# Patient Record
Sex: MALE | Race: WHITE | NOT HISPANIC OR LATINO | Employment: FULL TIME | URBAN - METROPOLITAN AREA
[De-identification: names, ages, dates, MRNs, and addresses within clinical notes are randomized per-mention and may not be internally consistent; named-entity substitution may affect disease eponyms.]

---

## 2017-08-03 ENCOUNTER — OFFICE VISIT (OUTPATIENT)
Dept: URGENT CARE | Facility: CLINIC | Age: 47
End: 2017-08-03
Payer: COMMERCIAL

## 2017-08-03 VITALS
RESPIRATION RATE: 15 BRPM | WEIGHT: 170 LBS | OXYGEN SATURATION: 99 % | TEMPERATURE: 99.1 F | SYSTOLIC BLOOD PRESSURE: 152 MMHG | DIASTOLIC BLOOD PRESSURE: 92 MMHG | HEART RATE: 82 BPM | HEIGHT: 73 IN | BODY MASS INDEX: 22.53 KG/M2

## 2017-08-03 DIAGNOSIS — R09.81 SINUS CONGESTION: ICD-10-CM

## 2017-08-03 DIAGNOSIS — H92.02 OTALGIA, LEFT: ICD-10-CM

## 2017-08-03 DIAGNOSIS — H61.22 IMPACTED CERUMEN OF LEFT EAR: ICD-10-CM

## 2017-08-03 DIAGNOSIS — R09.82 PND (POST-NASAL DRIP): ICD-10-CM

## 2017-08-03 PROCEDURE — 99204 OFFICE O/P NEW MOD 45 MIN: CPT | Mod: 25 | Performed by: PHYSICIAN ASSISTANT

## 2017-08-03 RX ORDER — NEOMYCIN SULFATE, POLYMYXIN B SULFATE, HYDROCORTISONE 3.5; 10000; 1 MG/ML; [USP'U]/ML; MG/ML
4 SOLUTION/ DROPS AURICULAR (OTIC) 3 TIMES DAILY
Qty: 1 BOTTLE | Refills: 0 | Status: SHIPPED | OUTPATIENT
Start: 2017-08-03 | End: 2017-08-13

## 2017-08-03 ASSESSMENT — ENCOUNTER SYMPTOMS
ABDOMINAL PAIN: 0
COUGH: 0
NAUSEA: 0
CHILLS: 0
VOMITING: 0
FEVER: 0
BLURRED VISION: 0
SPUTUM PRODUCTION: 0
SORE THROAT: 0
DIZZINESS: 0
NECK PAIN: 0
DIARRHEA: 0
WHEEZING: 0
SHORTNESS OF BREATH: 0
BACK PAIN: 0

## 2017-08-03 NOTE — MR AVS SNAPSHOT
"        Suly Real   8/3/2017 12:30 PM   Office Visit   MRN: [id number]    Department:  [facility]   Dept Phone:  [phone]    Description:  Male : 1970   Provider:  Luciano Orona PA-C           Reason for Visit     Sinusitis x 1 week w/left ear pain/clogged feeling      Allergies as of 8/3/2017     Allergen Noted Reactions    Nkda [No Known Drug Allergy] 08/15/2008         You were diagnosed with     PND (post-nasal drip)   [377136]       Sinus congestion   [512817]       Impacted cerumen of left ear   [812038]       Otalgia, left   [662311]         Vital Signs     Blood Pressure Pulse Temperature Respirations Height Weight    152/92 mmHg 82 37.3 °C (99.1 °F) 15 1.854 m (6' 0.99\") 77.111 kg (170 lb)    Body Mass Index Oxygen Saturation Smoking Status             22.43 kg/m2 99% Never Smoker          Basic Information     Date Of Birth Sex Race Ethnicity Preferred Language    1970 Male White Non- English      Health Maintenance     Patient has no pending health maintenance at this time      Current Immunizations     No immunizations on file.      Below and/or attached are the medications your provider expects you to take. Review all of your home medications and newly ordered medications with your provider and/or pharmacist. Follow medication instructions as directed by your provider and/or pharmacist. Please keep your medication list with you and share with your provider. Update the information when medications are discontinued, doses are changed, or new medications (including over-the-counter products) are added; and carry medication information at all times in the event of emergency situations     Allergies:  NKDA - (reactions not documented)               Medications  Valid as of: 2017 -  2:16 PM    Generic Name Brand Name Tablet Size Instructions for use    Hydrocodone-Acetaminophen (Tab) NORCO 5-325 MG Take 1-2 Tabs by mouth every four hours as needed.       " Neomycin-Polymyxin-HC (Solution) NEOMYCIN-POLYMYXIN-HC (OTIC) 1 % Place 4 Drops in ear 3 times a day for 10 days.        .                 Medicines prescribed today were sent to:     [facility], [place] - [address] [place] [address]    Phone: [phone] Fax: [phone]    Open 24 Hours?: No      Medication refill instructions:       If your prescription bottle indicates you have medication refills left, it is not necessary to call your provider’s office. Please contact your pharmacy and they will refill your medication.    If your prescription bottle indicates you do not have any refills left, you may request refills at any time through one of the following ways: The online [employer] system (except Urgent Care), by calling your provider’s office, or by asking your pharmacy to contact your provider’s office with a refill request. Medication refills are processed only during regular business hours and may not be available until the next business day. Your provider may request additional information or to have a follow-up visit with you prior to refilling your medication.   *Please Note: Medication refills are assigned a new Rx number when refilled electronically. Your pharmacy may indicate that no refills were authorized even though a new prescription for the same medication is available at the pharmacy. Please request the medicine by name with the pharmacy before contacting your provider for a refill.        Referral     A referral request has been sent to our patient care coordination department. Please allow 3-5 business days for us to process this request and contact you either by phone or mail. If you do not hear from us by the 5th business day, please call us at [phone].           [employer] Access Code: [email or web address]  Expires: 9/2/2017  2:16 PM    [employer] Software  A secure, online tool to manage your health information     [employer]’s Ziva Software® is a secure, online tool that  connects you to your personalized health information from the privacy of your home -- day or night - making it very easy for you to manage your healthcare. Once the activation process is completed, you can even access your medical information using the FoxyP2 jese, which is available for free in the Apple Jese store or Google Play store.     FoxyP2 provides the following levels of access (as shown below):   My Chart Features   [facility] Doctor [facility]  Specialists [facility]  Urgent  Care Non-[facility]  Primary Care  Doctor   Email your healthcare team securely and privately 24/7 X X X    Manage appointments: schedule your next appointment; view details of past/upcoming appointments X      Request prescription refills. X      View recent personal medical records, including lab and immunizations X X X X   View health record, including health history, allergies, medications X X X X   Read reports about your outpatient visits, procedures, consult and ER notes X X X X   See your discharge summary, which is a recap of your hospital and/or ER visit that includes your diagnosis, lab results, and care plan. X X       How to register for FoxyP2:  1. Go to  [email or web address].  2. Click on the Sign Up Now box, which takes you to the New Member Sign Up page. You will need to provide the following information:  a. Enter your FoxyP2 Access Code exactly as it appears at the top of this page. (You will not need to use this code after you’ve completed the sign-up process. If you do not sign up before the expiration date, you must request a new code.)   b. Enter your date of birth.   c. Enter your home email address.   d. Click Submit, and follow the next screen’s instructions.  3. Create a FoxyP2 ID. This will be your FoxyP2 login ID and cannot be changed, so think of one that is secure and easy to remember.  4. Create a FoxyP2 password. You can change your password at any time.  5. Enter your Password Reset Question  and Answer. This can be used at a later time if you forget your password.   6. Enter your e-mail address. This allows you to receive e-mail notifications when new information is available in [email or web address].  7. Click Sign Up. You can now view your health information.    For assistance activating your [email or web address] account, call [phone]

## 2017-08-03 NOTE — Clinical Note
August 3, 2017       Patient: Suly Real   YOB: 1970   Date of Visit: 8/3/2017         To Whom It May Concern:    It is my medical opinion that Suly Real should be excused from work for yesterday and today due to illness.      If you have any questions or concerns, please don't hesitate to call [phone]          Sincerely,          Luciano Orona PA-C  Electronically Signed

## 2017-08-03 NOTE — PROGRESS NOTES
Subjective:      Suly Real is a 46 y.o. male who presents with Sinusitis            Sinusitis  Associated symptoms include congestion ( sinus) and ear pain ( left  very mild). Pertinent negatives include no chills, coughing, neck pain, shortness of breath or sore throat.   last one week of pressure and discomfort to left ear muffled sounds to left ear , denies fever/chills/cough, denies sorethroat, denies other side ear pain, c/o some to left ,denies nausea/voimting/adpaind/airreha/rash. Denies PMH of aom, dcwg3of PMH ofsinusitis    Review of Systems   Constitutional: Negative for fever and chills.   HENT: Positive for congestion ( sinus), ear pain ( left  very mild) and hearing loss ( left). Negative for ear discharge, sore throat and tinnitus.    Eyes: Negative for blurred vision.   Respiratory: Negative for cough, sputum production, shortness of breath and wheezing.    Cardiovascular: Negative for chest pain.   Gastrointestinal: Negative for nausea, vomiting, abdominal pain and diarrhea.   Musculoskeletal: Negative for back pain and neck pain.   Skin: Negative for rash.   Neurological: Negative for dizziness.   Endo/Heme/Allergies: Environmental allergies:  ? no PMH.     PMH:  has a past medical history of Cellulitis. He also has no past medical history of Congestive heart failure (CMS-HCC), Cancer (CMS-HCC), Infectious disease, COPD, Diabetes, ASTHMA, CAD (coronary artery disease), Stroke (CMS-HCC), Seizure disorder (CMS-HCC), Hypertension, Renal disorder, Liver disease, or Psychiatric disorder.  MEDS:   Current outpatient prescriptions:   •  NEOMYCIN-POLYMYXIN-HC, OTIC, 1 % Solution, Place 4 Drops in ear 3 times a day for 10 days., Disp: 1 Bottle, Rfl: 0  •  hydrocodone-acetaminophen (NORCO) 5-325 MG Tab per tablet, Take 1-2 Tabs by mouth every four hours as needed., Disp: 15 Tab, Rfl: 0  ALLERGIES:   Allergies   Allergen Reactions   • Nkda [No Known Drug Allergy]      SURGHX: History reviewed. No  "pertinent past surgical history.  SOCHX:  reports that he has never smoked. He has never used smokeless tobacco. He reports that he does not drink alcohol or use illicit drugs.  FH: Family history was reviewed, no pertinent findings to report    I have worn a mask for the entire encounter with this patient.        Objective:     /92 mmHg  Pulse 82  Temp(Src) 37.3 °C (99.1 °F)  Resp 15  Ht 1.854 m (6' 0.99\")  Wt 77.111 kg (170 lb)  BMI 22.43 kg/m2  SpO2 99%     Physical Exam   Constitutional: He is oriented to person, place, and time. He appears well-developed and well-nourished. No distress.   HENT:   Head: Normocephalic and atraumatic.   Right Ear: External ear and ear canal normal. Tympanic membrane is bulging. Tympanic membrane is not erythematous.   Left Ear: External ear normal. Tympanic membrane is bulging. Tympanic membrane is not erythematous.   Nose: Nose normal. Right sinus exhibits no maxillary sinus tenderness and no frontal sinus tenderness. Left sinus exhibits no maxillary sinus tenderness and no frontal sinus tenderness.   Mouth/Throat: Uvula is midline and mucous membranes are normal. Posterior oropharyngeal erythema ( PND) present. No oropharyngeal exudate, posterior oropharyngeal edema or tonsillar abscesses.   Left canal w/ cerumen impaction, erythema to visible canal   Eyes: Conjunctivae are normal. Right eye exhibits no discharge. Left eye exhibits no discharge. No scleral icterus.   Neck: Neck supple.   Pulmonary/Chest: Effort normal and breath sounds normal. No respiratory distress. He has no decreased breath sounds. He has no wheezes. He has no rhonchi. He has no rales.   Musculoskeletal: Normal range of motion.   Lymphadenopathy:     He has no cervical adenopathy.   Neurological: He is alert and oriented to person, place, and time. He exhibits normal muscle tone. Coordination normal.   Skin: Skin is warm and dry. He is not diaphoretic. No pallor.   Psychiatric: He has a normal " mood and affect.   Nursing note and vitals reviewed.         Procedure: Cerumen Removal  Risks and benefits of procedure discussed  Cerumen removed with curette and lavage after softening agent instilled  Patient tolerated well  Post procedure exam with mild erythema to canal and normal left TM     Assessment/Plan:     1. PND (post-nasal drip)  Supportive care is reviewed with patient/caregiver - recommend to push PO fluids and electrolytes, Nsaids/tylenol, netti pot/saline irrig, humidifier in home, flonase, ponaris, antihistamines, suspect mild viral URI w/ cerumen impaction and untx;ed allerg, discuss techniques of cerumen clearance on own at home  Return to clinic with lack of resolution or progression of symptoms.  F/u w/ PCP - requests referral to establish all of family      - REFERRAL TO FAMILY PRACTICE    2. Sinus congestion    - REFERRAL TO FAMILY PRACTICE    3. Impacted cerumen of left ear    - REFERRAL TO FAMILY PRACTICE  - NEOMYCIN-POLYMYXIN-HC, OTIC, 1 % Solution; Place 4 Drops in ear 3 times a day for 10 days.  Dispense: 1 Bottle; Refill: 0    4. Otalgia, left    - REFERRAL TO FAMILY PRACTICE  - NEOMYCIN-POLYMYXIN-HC, OTIC, 1 % Solution; Place 4 Drops in ear 3 times a day for 10 days.  Dispense: 1 Bottle; Refill: 0

## 2017-10-23 ENCOUNTER — HOSPITAL ENCOUNTER (EMERGENCY)
Facility: MEDICAL CENTER | Age: 47
End: 2017-10-23
Attending: EMERGENCY MEDICINE
Payer: COMMERCIAL

## 2017-10-23 VITALS
BODY MASS INDEX: 21.87 KG/M2 | OXYGEN SATURATION: 99 % | HEIGHT: 73 IN | HEART RATE: 83 BPM | WEIGHT: 165 LBS | DIASTOLIC BLOOD PRESSURE: 91 MMHG | SYSTOLIC BLOOD PRESSURE: 134 MMHG | TEMPERATURE: 98.9 F | RESPIRATION RATE: 20 BRPM

## 2017-10-23 DIAGNOSIS — M54.41 ACUTE RIGHT-SIDED LOW BACK PAIN WITH RIGHT-SIDED SCIATICA: ICD-10-CM

## 2017-10-23 PROCEDURE — 700111 HCHG RX REV CODE 636 W/ 250 OVERRIDE (IP): Performed by: EMERGENCY MEDICINE

## 2017-10-23 PROCEDURE — 96372 THER/PROPH/DIAG INJ SC/IM: CPT

## 2017-10-23 PROCEDURE — 99283 EMERGENCY DEPT VISIT LOW MDM: CPT

## 2017-10-23 RX ORDER — KETOROLAC TROMETHAMINE 30 MG/ML
30 INJECTION, SOLUTION INTRAMUSCULAR; INTRAVENOUS ONCE
Status: COMPLETED | OUTPATIENT
Start: 2017-10-23 | End: 2017-10-23

## 2017-10-23 RX ORDER — NAPROXEN 500 MG/1
500 TABLET ORAL 2 TIMES DAILY WITH MEALS
Qty: 15 TAB | Refills: 0 | Status: SHIPPED | OUTPATIENT
Start: 2017-10-23

## 2017-10-23 RX ORDER — CYCLOBENZAPRINE HCL 10 MG
10 TABLET ORAL 3 TIMES DAILY PRN
Qty: 10 TAB | Refills: 0 | Status: SHIPPED | OUTPATIENT
Start: 2017-10-23

## 2017-10-23 RX ORDER — HYDROCODONE BITARTRATE AND ACETAMINOPHEN 5; 325 MG/1; MG/1
1-2 TABLET ORAL EVERY 4 HOURS PRN
Qty: 10 TAB | Refills: 0 | Status: SHIPPED | OUTPATIENT
Start: 2017-10-23

## 2017-10-23 RX ADMIN — KETOROLAC TROMETHAMINE 30 MG: 30 INJECTION, SOLUTION INTRAMUSCULAR at 18:48

## 2017-10-23 ASSESSMENT — PAIN SCALES - GENERAL
PAINLEVEL_OUTOF10: 6
PAINLEVEL_OUTOF10: 10
PAINLEVEL_OUTOF10: 7

## 2017-10-24 NOTE — ED NOTES
Patient verbalized understanding of discharge instructions including order not to drive or drink alcohol for 6-12 hrs following narcotic pr muscle relaxant use, no questions at this time. VS stable, patient will ambulate to exit with d/c instructions and rx in hand.

## 2017-10-24 NOTE — ED PROVIDER NOTES
"CHIEF COMPLAINT  Chief Complaint   Patient presents with   • Low Back Pain       HPI  Suly Real is a 46 y.o. male who presents With history of sciatic right low back pain. The patient has been having a current flare of this pain since Saturday. He believes that he twisted and moved wrong and that this set off back pain. He denies any fevers, chills or any numbness around his inner thigh or legs. He denies any loss of bladder or bowel function. He denies any history of cancer. The patient denies any recent falls. The patient says the pain radiates down his left leg and has been constant in intensity but made worse when he bends. The patient's is able to ambulate but it is difficult secondary to the pain. He has not tried any medications to help alleviate the pain. He has had episodes similar to this in the past that have been helped with Flexeril as well as steroids and Percocet.    REVIEW OF SYSTEMS  See HPI for further details. All other systems are negative.     PAST MEDICAL HISTORY   has a past medical history of Cellulitis.    SOCIAL HISTORY  Social History     Social History Main Topics   • Smoking status: Never Smoker   • Smokeless tobacco: Never Used   • Alcohol use No   • Drug use: No   • Sexual activity: Not on file       SURGICAL HISTORY  patient denies any surgical history    CURRENT MEDICATIONS  Home Medications     Reviewed by Shirin Steiner R.N. (Registered Nurse) on 10/23/17 at 1811  Med List Status: Not Addressed   Medication Last Dose Status   hydrocodone-acetaminophen (NORCO) 5-325 MG Tab per tablet  Active                ALLERGIES  Allergies   Allergen Reactions   • Nkda [No Known Drug Allergy]        PHYSICAL EXAM  VITAL SIGNS: /91   Pulse 83   Temp 37.2 °C (98.9 °F)   Resp 20   Ht 1.854 m (6' 1\")   Wt 74.8 kg (165 lb) Comment: patient brought in to ED via wheelchair, pt states that he is unable to stand at this time due to pain, stated weight obtained.   SpO2 99%   BMI " 21.77 kg/m²  @ANGEL LUIS[[id number]::@  Pulse ox interpretation: I interpret this pulse ox as normal.  Constitutional: Alert in no apparent distress.  HENT: Normocephalic, Atraumatic, Bilateral external ears normal. Nose normal.   Eyes: Pupils are equal and reactive. Conjunctiva normal, non-icteric.   Heart: Regular rate and rythm, no murmurs.    Lungs: Clear to auscultation bilaterally.  Skin: Warm, Dry, No erythema, No rash.   Neurologic: Alert, Grossly non-focal.   Back: No midline tenderness, mild right sided paraspinal tenderness.5/5 strength in flexion and extension of hips, knees as well as 5 out of 5 strength in dorsiflexion and plantar flexion. The patient has sensation intact to light touch throughout his lower extremities as well as sensation to light touch in the saddle region.  Psychiatric: Affect normal, Judgment normal, Mood normal, Appears appropriate and not intoxicated.           COURSE & MEDICAL DECISION MAKING  Pertinent Labs & Imaging studies reviewed. (See chart for details)    46-year-old male who presents with back pain that is typical for him. There is no midline tenderness to suggest fracture and there are no red flags for back pain. I believe this is sciatica related back pain and we'll initially treat the patient with Flexeril. I do not believe this represents urinary tract infection given there is no CVA tenderness and no dysuria. The patient also has no weakness or numbness to suggest chest spinal cord compression. I will reassess his pain after intervention.    7:31 PM  Patient much improved after ketorolac. At this point I'll give the patient a short course of Norco as well as Flexeril and NSAIDs. I told him to find a PCP and see in the next 2-3 days to follow up for his back pain. Patient is ambulated without difficulty at this time. Strict return precautions were given.    The patient will not drink alcohol nor drive with prescribed medications. The patient will return for worsening symptoms  and is stable at the time of discharge. The patient verbalizes understanding and will comply.    FINAL IMPRESSION  1. Acute right-sided low back pain with right-sided sciatica              Electronically signed by: Christopher Carter, 10/23/2017 6:13 PM

## 2017-10-24 NOTE — DISCHARGE INSTRUCTIONS
Sciatica  Sciatica is pain, weakness, numbness, or tingling along the path of the sciatic nerve. The nerve starts in the lower back and runs down the back of each leg. The nerve controls the muscles in the lower leg and in the back of the knee, while also providing sensation to the back of the thigh, lower leg, and the sole of your foot. Sciatica is a symptom of another medical condition. For instance, nerve damage or certain conditions, such as a herniated disk or bone spur on the spine, pinch or put pressure on the sciatic nerve. This causes the pain, weakness, or other sensations normally associated with sciatica. Generally, sciatica only affects one side of the body.  CAUSES   · Herniated or slipped disc.  · Degenerative disk disease.  · A pain disorder involving the narrow muscle in the buttocks (piriformis syndrome).  · Pelvic injury or fracture.  · Pregnancy.  · Tumor (rare).  SYMPTOMS   Symptoms can vary from mild to very severe. The symptoms usually travel from the low back to the buttocks and down the back of the leg. Symptoms can include:  · Mild tingling or dull aches in the lower back, leg, or hip.  · Numbness in the back of the calf or sole of the foot.  · Burning sensations in the lower back, leg, or hip.  · Sharp pains in the lower back, leg, or hip.  · Leg weakness.  · Severe back pain inhibiting movement.  These symptoms may get worse with coughing, sneezing, laughing, or prolonged sitting or standing. Also, being overweight may worsen symptoms.  DIAGNOSIS   Your caregiver will perform a physical exam to look for common symptoms of sciatica. He or she may ask you to do certain movements or activities that would trigger sciatic nerve pain. Other tests may be performed to find the cause of the sciatica. These may include:  · Blood tests.  · X-rays.  · Imaging tests, such as an MRI or CT scan.  TREATMENT   Treatment is directed at the cause of the sciatic pain. Sometimes, treatment is not necessary  and the pain and discomfort goes away on its own. If treatment is needed, your caregiver may suggest:  · Over-the-counter medicines to relieve pain.  · Prescription medicines, such as anti-inflammatory medicine, muscle relaxants, or narcotics.  · Applying heat or ice to the painful area.  · Steroid injections to lessen pain, irritation, and inflammation around the nerve.  · Reducing activity during periods of pain.  · Exercising and stretching to strengthen your abdomen and improve flexibility of your spine. Your caregiver may suggest losing weight if the extra weight makes the back pain worse.  · Physical therapy.  · Surgery to eliminate what is pressing or pinching the nerve, such as a bone spur or part of a herniated disk.  HOME CARE INSTRUCTIONS   · Only take over-the-counter or prescription medicines for pain or discomfort as directed by your caregiver.  · Apply ice to the affected area for 20 minutes, 3-4 times a day for the first 48-72 hours. Then try heat in the same way.  · Exercise, stretch, or perform your usual activities if these do not aggravate your pain.  · Attend physical therapy sessions as directed by your caregiver.  · Keep all follow-up appointments as directed by your caregiver.  · Do not wear high heels or shoes that do not provide proper support.  · Check your mattress to see if it is too soft. A firm mattress may lessen your pain and discomfort.  SEEK IMMEDIATE MEDICAL CARE IF:   · You lose control of your bowel or bladder (incontinence).  · You have increasing weakness in the lower back, pelvis, buttocks, or legs.  · You have redness or swelling of your back.  · You have a burning sensation when you urinate.  · You have pain that gets worse when you lie down or awakens you at night.  · Your pain is worse than you have experienced in the past.  · Your pain is lasting longer than 4 weeks.  · You are suddenly losing weight without reason.  MAKE SURE YOU:  · Understand these  instructions.  · Will watch your condition.  · Will get help right away if you are not doing well or get worse.     This information is not intended to replace advice given to you by your health care provider. Make sure you discuss any questions you have with your health care provider.     Document Released: 12/12/2002 Document Revised: 06/18/2013 Document Reviewed: 04/28/2013  [employer] Interactive Patient Education ©2016 [employer]    Radicular Pain  Radicular pain in either the arm or leg is usually from a bulging or herniated disk in the spine. A piece of the herniated disk may press against the nerves as the nerves exit the spine. This causes pain which is felt at the tips of the nerves down the arm or leg. Other causes of radicular pain may include:  · Fractures.  · Heart disease.  · Cancer.  · An abnormal and usually degenerative state of the nervous system or nerves (neuropathy).  Diagnosis may require CT or MRI scanning to determine the primary cause.   Nerves that start at the neck (nerve roots) may cause radicular pain in the outer shoulder and arm. It can spread down to the thumb and fingers. The symptoms vary depending on which nerve root has been affected. In most cases radicular pain improves with conservative treatment. Neck problems may require physical therapy, a neck collar, or cervical traction. Treatment may take many weeks, and surgery may be considered if the symptoms do not improve.   Conservative treatment is also recommended for sciatica. Sciatica causes pain to radiate from the lower back or buttock area down the leg into the foot. Often there is a history of back problems. Most patients with sciatica are better after 2 to 4 weeks of rest and other supportive care. Short term bed rest can reduce the disk pressure considerably. Sitting, however, is not a good position since this increases the pressure on the disk. You should avoid bending, lifting, and all other activities which make the  problem worse. Traction can be used in severe cases. Surgery is usually reserved for patients who do not improve within the first months of treatment.  Only take over-the-counter or prescription medicines for pain, discomfort, or fever as directed by your caregiver. Narcotics and muscle relaxants may help by relieving more severe pain and spasm and by providing mild sedation. Cold or massage can give significant relief. Spinal manipulation is not recommended. It can increase the degree of disc protrusion. Epidural steroid injections are often effective treatment for radicular pain. These injections deliver medicine to the spinal nerve in the space between the protective covering of the spinal cord and back bones (vertebrae). Your caregiver can give you more information about steroid injections. These injections are most effective when given within two weeks of the onset of pain.   You should see your caregiver for follow up care as recommended. A program for neck and back injury rehabilitation with stretching and strengthening exercises is an important part of management.   SEEK IMMEDIATE MEDICAL CARE IF:  · You develop increased pain, weakness, or numbness in your arm or leg.  · You develop difficulty with bladder or bowel control.  · You develop abdominal pain.     This information is not intended to replace advice given to you by your health care provider. Make sure you discuss any questions you have with your health care provider.     Document Released: 01/25/2006 Document Revised: 01/08/2016 Document Reviewed: 04/12/2010  [employer] Interactive Patient Education ©2016 [employer]

## 2017-10-24 NOTE — ED NOTES
Pt has ongoing back problems, does not have PCP in [place]. Two days ago pt twisted wrong and his back has hurt since. Reports flexeril, steroid and percocet has worked for him in the past.

## 2017-11-22 ENCOUNTER — OFFICE VISIT (OUTPATIENT)
Dept: OCCUPATIONAL MEDICINE | Facility: CLINIC | Age: 47
End: 2017-11-22

## 2017-11-22 ENCOUNTER — NON-PROVIDER VISIT (OUTPATIENT)
Dept: OCCUPATIONAL MEDICINE | Facility: CLINIC | Age: 47
End: 2017-11-22

## 2017-11-22 DIAGNOSIS — Z02.89 VISIT FOR OCCUPATIONAL HEALTH EXAMINATION: ICD-10-CM

## 2017-11-22 PROCEDURE — 8916 PR CLEARANCE ONLY: Performed by: PREVENTIVE MEDICINE

## 2017-11-22 PROCEDURE — 92553 AUDIOMETRY AIR & BONE: CPT | Performed by: PREVENTIVE MEDICINE

## 2019-04-19 ENCOUNTER — OFFICE VISIT (OUTPATIENT)
Dept: URGENT CARE | Facility: PHYSICIAN GROUP | Age: 49
End: 2019-04-19
Payer: COMMERCIAL

## 2019-04-19 VITALS
OXYGEN SATURATION: 96 % | TEMPERATURE: 98 F | HEART RATE: 110 BPM | DIASTOLIC BLOOD PRESSURE: 90 MMHG | SYSTOLIC BLOOD PRESSURE: 142 MMHG | RESPIRATION RATE: 16 BRPM

## 2019-04-19 DIAGNOSIS — S61.211A LACERATION OF LEFT INDEX FINGER WITHOUT FOREIGN BODY WITHOUT DAMAGE TO NAIL, INITIAL ENCOUNTER: ICD-10-CM

## 2019-04-19 PROCEDURE — 12041 INTMD RPR N-HF/GENIT 2.5CM/<: CPT | Performed by: PHYSICIAN ASSISTANT

## 2019-04-19 PROCEDURE — 90471 IMMUNIZATION ADMIN: CPT | Performed by: PHYSICIAN ASSISTANT

## 2019-04-19 PROCEDURE — 90715 TDAP VACCINE 7 YRS/> IM: CPT | Performed by: PHYSICIAN ASSISTANT

## 2019-04-19 RX ORDER — CEPHALEXIN 500 MG/1
500 CAPSULE ORAL 3 TIMES DAILY
Qty: 15 CAP | Refills: 0 | Status: SHIPPED | OUTPATIENT
Start: 2019-04-19 | End: 2019-04-24

## 2019-04-20 NOTE — PROGRESS NOTES
Chief Complaint   Patient presents with   • Laceration     finger with skill saw        HISTORY OF PRESENT ILLNESS: Patient is a 48 y.o. male who presents today for the following:    Patient comes in for evaluation of a laceration on his left index finger that he sustained just prior to arrival.  Patient states he cut on a skill saw at home.  He has full range of motion and denies distal paresthesias.  His last tetanus shot was more than 10 years ago.  He has not taken any over-the-counter medication.    There are no active problems to display for this patient.      Allergies:Nkda [no known drug allergy]    Current Outpatient Prescriptions Ordered in [facility]   Medication Sig Dispense Refill   • cephALEXin (KEFLEX) 500 MG Cap Take 1 Cap by mouth 3 times a day for 5 days. 15 Cap 0   • hydrocodone-acetaminophen (NORCO) 5-325 MG Tab per tablet Take 1-2 Tabs by mouth every four hours as needed. (Patient not taking: Reported on 4/19/2019) 10 Tab 0   • cyclobenzaprine (FLEXERIL) 10 MG Tab Take 1 Tab by mouth 3 times a day as needed. (Patient not taking: Reported on 4/19/2019) 10 Tab 0   • naproxen (NAPROSYN) 500 MG Tab Take 1 Tab by mouth 2 times a day, with meals. (Patient not taking: Reported on 4/19/2019) 15 Tab 0     No current Epic-ordered facility-administered medications on file.        Past Medical History:   Diagnosis Date   • Cellulitis     right foot       Social History   Substance Use Topics   • Smoking status: Never Smoker   • Smokeless tobacco: Never Used   • Alcohol use No       No family status information on file.   No family history on file.    Review of Systems:   Constitutional ROS: No unexpected change in weight, No weakness, No fatigue  Eye ROS: No recent significant change in vision, No eye pain, redness, discharge  Ear ROS: No drainage, No tinnitus or vertigo, No recent change in hearing  Mouth/Throat ROS: No teeth or gum problems, No bleeding gums, No tongue complaints  Neck ROS: No swollen glands,  No significant pain in neck  Pulmonary ROS: No chronic cough, sputum, or hemoptysis, No dyspnea on exertion, No wheezing  Cardiovascular ROS: No diaphoresis, No edema, No palpitations  Gastrointestinal ROS: No change in bowel habits, No significant change in appetite, No nausea, vomiting, diarrhea, or constipation  Musculoskeletal/Extremities ROS: Laceration left index finger.  Hematologic/Lymphatic ROS: No chills, No night sweats, No weight loss  Skin/Integumentary ROS: No edema, No evidence of rash, No itching      Exam:  /90   Pulse (!) 110   Temp 36.7 °C (98 °F)   Resp 16   SpO2 96%   General: Well developed, well nourished. No distress.  Pulmonary: Unlabored respiratory effort.    Extremities: Left index finger with a 1.5 cm laceration on the flexor surface over the PIP.  Actively bleeding.  Full active range of motion.  Neurologic: Grossly nonfocal. No facial asymmetry noted.  Psych: Normal mood. Alert and oriented x3. Judgment and insight is normal.    LACERATION REPAIR PROCEDURE NOTE  The patient's identification was confirmed and consent was obtained.  This procedure was performed by Sosa Rousseau PA-C at 1630 hrs.  Site: Left index finger  Sterile procedures observed  Anesthetic used (type and amt): 2% lidocaine without epinephrine, 5 mL, digital block  Suture type/size: 4-0 Ethilon  Length: 1.5 cm  # of Sutures/staples: #4 interrupted    Complexity: Simple     Tetanus: Updated in clinic      Assessment/Plan:  Discussed wound care at home.  Covering with antibiotics prophylactically given the proximity to the joint space.  Use all medication as directed.  Return to clinic in 10 days for suture removal, sooner for any signs of infection as discussed in clinic.     1. Laceration of left index finger without foreign body without damage to nail, initial encounter  cephALEXin (KEFLEX) 500 MG Cap